# Patient Record
Sex: FEMALE | Race: WHITE | NOT HISPANIC OR LATINO | URBAN - NONMETROPOLITAN AREA
[De-identification: names, ages, dates, MRNs, and addresses within clinical notes are randomized per-mention and may not be internally consistent; named-entity substitution may affect disease eponyms.]

---

## 2021-07-15 ENCOUNTER — OFFICE VISIT (OUTPATIENT)
Dept: URBAN - NONMETROPOLITAN AREA SURGERY CENTER 1 | Facility: SURGERY CENTER | Age: 46
End: 2021-07-15
Payer: COMMERCIAL

## 2021-07-15 ENCOUNTER — CLAIMS CREATED FROM THE CLAIM WINDOW (OUTPATIENT)
Dept: URBAN - METROPOLITAN AREA CLINIC 4 | Facility: CLINIC | Age: 46
End: 2021-07-15
Payer: COMMERCIAL

## 2021-07-15 DIAGNOSIS — K63.89 POLYP OF ILEUM: ICD-10-CM

## 2021-07-15 DIAGNOSIS — K52.3 INDETERMINATE COLITIS: ICD-10-CM

## 2021-07-15 DIAGNOSIS — Z12.11 COLON CANCER SCREENING: ICD-10-CM

## 2021-07-15 DIAGNOSIS — K52.3 COLITIS, INDETERMINATE: ICD-10-CM

## 2021-07-15 DIAGNOSIS — K63.89 BACTERIAL OVERGROWTH SYNDROME: ICD-10-CM

## 2021-07-15 PROCEDURE — 45380 COLONOSCOPY AND BIOPSY: CPT | Performed by: INTERNAL MEDICINE

## 2021-07-15 PROCEDURE — G8907 PT DOC NO EVENTS ON DISCHARG: HCPCS | Performed by: INTERNAL MEDICINE

## 2021-07-15 PROCEDURE — 88305 TISSUE EXAM BY PATHOLOGIST: CPT | Performed by: PATHOLOGY

## 2021-07-22 ENCOUNTER — TELEPHONE ENCOUNTER (OUTPATIENT)
Dept: URBAN - NONMETROPOLITAN AREA CLINIC 2 | Facility: CLINIC | Age: 46
End: 2021-07-22

## 2021-10-04 ENCOUNTER — OFFICE VISIT (OUTPATIENT)
Dept: URBAN - NONMETROPOLITAN AREA CLINIC 2 | Facility: CLINIC | Age: 46
End: 2021-10-04
Payer: COMMERCIAL

## 2021-10-04 ENCOUNTER — DASHBOARD ENCOUNTERS (OUTPATIENT)
Age: 46
End: 2021-10-04

## 2021-10-04 VITALS
HEART RATE: 76 BPM | TEMPERATURE: 97.2 F | WEIGHT: 193.2 LBS | SYSTOLIC BLOOD PRESSURE: 118 MMHG | DIASTOLIC BLOOD PRESSURE: 72 MMHG | HEIGHT: 62 IN | BODY MASS INDEX: 35.55 KG/M2

## 2021-10-04 DIAGNOSIS — R19.5 LOOSE BOWEL MOVEMENTS: ICD-10-CM

## 2021-10-04 DIAGNOSIS — K52.9 COLITIS: ICD-10-CM

## 2021-10-04 DIAGNOSIS — Z87.891 HISTORY OF TOBACCO USE: ICD-10-CM

## 2021-10-04 PROBLEM — 64226004: Status: ACTIVE | Noted: 2021-10-04

## 2021-10-04 PROBLEM — 398032003: Status: ACTIVE | Noted: 2021-10-04

## 2021-10-04 PROBLEM — 365980008: Status: ACTIVE | Noted: 2021-10-04

## 2021-10-04 PROCEDURE — 99214 OFFICE O/P EST MOD 30 MIN: CPT | Performed by: INTERNAL MEDICINE

## 2021-10-04 RX ORDER — POLYETHYLENE GLYCOL 3350, SODIUM SULFATE, SODIUM CHLORIDE, POTASSIUM CHLORIDE, ASCORBIC ACID, SODIUM ASCORBATE 140-9-5.2G
AS DIRECTED KIT ORAL AS DIRECTED
Qty: 280 GRAM | Refills: 0 | OUTPATIENT
Start: 2021-10-04 | End: 2021-10-05

## 2021-10-04 NOTE — HPI-TODAY'S VISIT:
10/4/2021: Gastroenterology Clinic Visit  Ms. Arrington is a 46 year old female with no significant past medical history who presents for follow-up after undergoing a direct access colonoscopy which showed patchy chronic active colitis in the transverse colon.  Ms. Arrington noted her first colonoscopy was on 7/15/2021. Colonoscopy revealed a normal termianl ileum, 3 mm polyp in the transverse colon s/p polypectomy, nodular mucosa in the transverse colon wiht biopsies returning as "patchy chronic active colitis, indeterminate type."   Prior to the colonosocpy, Ms. Arrington did not have any GI symptoms.  She notes that following the colonoscopy, she has noted multiple loose bowel movements per day. She initially thought this was related to gluten and discontinued gluten but did not notice a change in symptoms. She did have one episode of bright red blood in the bowel movement. Denies abdominal pain or abdominal cramping.  She uses Aleve 2 times per week.    Her grandfather had a history of colon cancer. Her mother has a history of microscopic colitis as well as colon polyps.   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  7/15/2021: Colonoscopy Hemorrhoids were found on perianal exam. The terminal ileum appeared normal. 3 mm polyp was found in the transverse colon. Polypectomy performed. Nodular mucosa was found in the transverse colon. Biopsied. Single medium-mouthed diverticulum found in the transverse colon. Hemorrhoids were found during retroflexion. 7/15/2021: Pathology from Colonoscopy   (A) Colon, Transverse, Biopsy: PATCHY CHRONIC ACTIVE COLITIS, INDETERMINATE TYPE. See comment. Negative for Infectious Organisms, Dysplasia or Malignancy. COMMENT: While non-specific, the histological findings may represent Crohn's disease; however, the lack of sampling of terminal ileum and absence of characteristic morphologic changes such as pyloric gland metaplasia and epithelioid granulomas prohibits a definitive diagnosis. Like Crohn's disease, longstanding or partially treated ulcerative colitis can show areas of involved and uninvolved mucosa and variable degrees of disease activity. Patchy inflammation is not pathognomonic of Crohn's disease, especially in the setting of an absence of involvement of terminal ileum. Less likely, the changes may represent NSAID-associated colitis, chronic ischemic colitis and diverticular disease associated-colitis which is confined to the distribution of diverticulosis. Correlation with endoscopic findings and clinical history could be a key to make a further classification. (B) Colon, Transverse, Polypectomy: BENIGN MUCOSAL POLYP(S). See Comment. Negative for Dysplasia or Malignancy. COMMENT: We use "benign mucosal polyp" to refer to an endoscopically polypoid lesion that shows no dysplastic or hyperplastic epithelium or classic features of hamartomatous polyp.